# Patient Record
Sex: FEMALE | Race: WHITE | NOT HISPANIC OR LATINO
[De-identification: names, ages, dates, MRNs, and addresses within clinical notes are randomized per-mention and may not be internally consistent; named-entity substitution may affect disease eponyms.]

---

## 2020-10-31 ENCOUNTER — TRANSCRIPTION ENCOUNTER (OUTPATIENT)
Age: 40
End: 2020-10-31

## 2021-01-19 ENCOUNTER — TRANSCRIPTION ENCOUNTER (OUTPATIENT)
Age: 41
End: 2021-01-19

## 2021-02-09 ENCOUNTER — TRANSCRIPTION ENCOUNTER (OUTPATIENT)
Age: 41
End: 2021-02-09

## 2021-04-01 ENCOUNTER — TRANSCRIPTION ENCOUNTER (OUTPATIENT)
Age: 41
End: 2021-04-01

## 2021-07-01 ENCOUNTER — TRANSCRIPTION ENCOUNTER (OUTPATIENT)
Age: 41
End: 2021-07-01

## 2021-07-12 ENCOUNTER — TRANSCRIPTION ENCOUNTER (OUTPATIENT)
Age: 41
End: 2021-07-12

## 2021-08-08 ENCOUNTER — TRANSCRIPTION ENCOUNTER (OUTPATIENT)
Age: 41
End: 2021-08-08

## 2021-08-20 ENCOUNTER — TRANSCRIPTION ENCOUNTER (OUTPATIENT)
Age: 41
End: 2021-08-20

## 2022-01-21 ENCOUNTER — TRANSCRIPTION ENCOUNTER (OUTPATIENT)
Age: 42
End: 2022-01-21

## 2023-03-05 ENCOUNTER — NON-APPOINTMENT (OUTPATIENT)
Age: 43
End: 2023-03-05

## 2023-04-03 ENCOUNTER — APPOINTMENT (OUTPATIENT)
Dept: GASTROENTEROLOGY | Facility: CLINIC | Age: 43
End: 2023-04-03
Payer: MEDICAID

## 2023-04-03 ENCOUNTER — NON-APPOINTMENT (OUTPATIENT)
Age: 43
End: 2023-04-03

## 2023-04-03 DIAGNOSIS — K76.0 FATTY (CHANGE OF) LIVER, NOT ELSEWHERE CLASSIFIED: ICD-10-CM

## 2023-04-03 PROCEDURE — 91200 LIVER ELASTOGRAPHY: CPT

## 2023-04-03 NOTE — ASSESSMENT
[FreeTextEntry1] : FibroScan/ Vibration Controlled Transient Elastography (VCTE) Report\par \par PROBE SIZE: XL\par \par INDICATION: NAFLD/CONKLIN\par \par REFERRING PHYSICIAN: Dr. Will Lennon\par \par  \par PROCEDURE:\par \par Patient was NPO for 4 hours prior to procedure. After providing oral explanations of the procedure to the patient, patient was placed in supine position with right arm in maximum abduction to allow optimal exposure of right lateral abdomen. Patient abdomen was briefly assessed to identify to the terminus of the xyphoid process. The ideal target testing spot was located mid-line and lateral to this point. To acquire proper signals, the skin to liver capsule distance was accessed with the FibroScan probe. Patient was instructed to breathe normally and to abstain from sudden movements during the procedure. Ten shear waves were produced and measurements of the speed of each wave evaluated. Patient tolerated the procedure well and was discharged without incident.\par \par  \par FINDINGS:\par \par - Median shear wave speed of 1.33 meters/second.\par \par - This corresponds to a median Liver Stiffness Score of 5.3 kPa.\par \par - IQR/med 11%\par \par -  dB/m\par  \par RESULTS:\par \par FIBROSIS: F0/F1 Fibrosis\par \par STEATOSIS: S0 - Stage (0-10% steatosis)\par \par The recommendation regarding fibrosis stage and/ or steatosis grade is based on current published scientific literature and the provided patient’s diagnosis. Any further medical or surgical intervention should be made by considering the overall medical condition of the patient.

## 2023-10-07 ENCOUNTER — OFFICE (OUTPATIENT)
Dept: URBAN - METROPOLITAN AREA CLINIC 100 | Facility: CLINIC | Age: 43
Setting detail: OPHTHALMOLOGY
End: 2023-10-07
Payer: COMMERCIAL

## 2023-10-07 DIAGNOSIS — G43.109: ICD-10-CM

## 2023-10-07 DIAGNOSIS — H01.004: ICD-10-CM

## 2023-10-07 DIAGNOSIS — H01.001: ICD-10-CM

## 2023-10-07 DIAGNOSIS — H11.153: ICD-10-CM

## 2023-10-07 DIAGNOSIS — H16.223: ICD-10-CM

## 2023-10-07 DIAGNOSIS — H52.7: ICD-10-CM

## 2023-10-07 PROCEDURE — 99212 OFFICE O/P EST SF 10 MIN: CPT | Performed by: OPHTHALMOLOGY

## 2023-10-07 ASSESSMENT — KERATOMETRY
OD_K2POWER_DIOPTERS: 46.00
OD_K1POWER_DIOPTERS: 45.50
OD_AXISANGLE_DEGREES: 009
OS_K2POWER_DIOPTERS: 45.75
OS_K1POWER_DIOPTERS: 45.25
OS_AXISANGLE_DEGREES: 110

## 2023-10-07 ASSESSMENT — REFRACTION_CURRENTRX
OS_CYLINDER: -0.50
OD_SPHERE: -2.50
OS_OVR_VA: 20/
OD_VPRISM_DIRECTION: SV
OS_SPHERE: -2.50
OS_AXIS: 178
OD_AXIS: 018
OD_OVR_VA: 20/
OD_CYLINDER: -0.50
OS_VPRISM_DIRECTION: SV

## 2023-10-07 ASSESSMENT — REFRACTION_MANIFEST
OD_VA1: 20/20-
OS_SPHERE: -2.75
OS_AXIS: 005
OS_SPHERE: -2.50
OS_AXIS: 005
OD_VA1: 20/20
OD_SPHERE: -2.50
OD_AXIS: 015
OS_CYLINDER: -0.75
OD_CYLINDER: -0.50
OS_CYLINDER: -0.50
OS_VA1: 20/20-
OD_CYLINDER: -0.50
OD_AXIS: 015
OS_VA1: 20/20
OD_SPHERE: -2.50

## 2023-10-07 ASSESSMENT — REFRACTION_AUTOREFRACTION
OD_CYLINDER: -0.50
OS_SPHERE: -2.50
OS_AXIS: 014
OS_CYLINDER: -0.25
OD_AXIS: 003
OD_SPHERE: -2.25

## 2023-10-07 ASSESSMENT — PACHYMETRY
OS_CT_CORRECTION: 2
OS_CT_UM: 516
OD_CT_CORRECTION: 1
OD_CT_UM: 525

## 2023-10-07 ASSESSMENT — TONOMETRY
OS_IOP_MMHG: 18
OD_IOP_MMHG: 18

## 2023-10-07 ASSESSMENT — LID EXAM ASSESSMENTS
OS_BLEPHARITIS: LUL T
OD_BLEPHARITIS: RUL T

## 2023-10-07 ASSESSMENT — VISUAL ACUITY
OD_BCVA: 20/20
OS_BCVA: 20/20

## 2023-10-07 ASSESSMENT — AXIALLENGTH_DERIVED
OD_AL: 23.8394
OD_AL: 23.7404
OS_AL: 23.8836
OD_AL: 23.8394
OS_AL: 24.0849
OS_AL: 23.9336

## 2023-10-07 ASSESSMENT — SUPERFICIAL PUNCTATE KERATITIS (SPK)
OS_SPK: T
OD_SPK: T

## 2023-10-07 ASSESSMENT — SPHEQUIV_DERIVED
OS_SPHEQUIV: -2.75
OD_SPHEQUIV: -2.75
OS_SPHEQUIV: -2.625
OS_SPHEQUIV: -3.125
OD_SPHEQUIV: -2.5
OD_SPHEQUIV: -2.75

## 2023-10-07 ASSESSMENT — CONFRONTATIONAL VISUAL FIELD TEST (CVF)
OS_FINDINGS: FULL
OD_FINDINGS: FULL

## 2023-11-07 ENCOUNTER — OFFICE (OUTPATIENT)
Facility: LOCATION | Age: 43
Setting detail: OPHTHALMOLOGY
End: 2023-11-07
Payer: COMMERCIAL

## 2023-11-07 DIAGNOSIS — H31.29: ICD-10-CM

## 2023-11-07 DIAGNOSIS — H43.393: ICD-10-CM

## 2023-11-07 DIAGNOSIS — H16.223: ICD-10-CM

## 2023-11-07 DIAGNOSIS — H43.811: ICD-10-CM

## 2023-11-07 DIAGNOSIS — H35.443: ICD-10-CM

## 2023-11-07 PROCEDURE — 99213 OFFICE O/P EST LOW 20 MIN: CPT | Performed by: OPHTHALMOLOGY

## 2023-11-07 PROCEDURE — 92250 FUNDUS PHOTOGRAPHY W/I&R: CPT | Performed by: OPHTHALMOLOGY

## 2023-11-07 ASSESSMENT — CONFRONTATIONAL VISUAL FIELD TEST (CVF)
OD_FINDINGS: FULL
OS_FINDINGS: FULL

## 2023-11-08 PROBLEM — H16.223 DRY EYE SYNDROME K SICCA; BOTH EYES: Status: ACTIVE | Noted: 2023-11-07

## 2023-11-08 PROBLEM — H31.29 PERIPAPILLARY ATROPHY ; BOTH EYES: Status: ACTIVE | Noted: 2023-11-07

## 2023-11-08 ASSESSMENT — REFRACTION_AUTOREFRACTION
OD_AXIS: 113
OD_SPHERE: -2.75
OS_AXIS: 088
OS_CYLINDER: +0.50
OD_CYLINDER: +0.75
OS_SPHERE: -3.00

## 2023-11-08 ASSESSMENT — REFRACTION_MANIFEST
OS_CYLINDER: -0.75
OD_AXIS: 015
OD_VA1: 20/20
OS_SPHERE: -2.50
OS_AXIS: 005
OD_SPHERE: -2.50
OD_CYLINDER: -0.50
OS_VA1: 20/20
OD_SPHERE: -2.50
OD_AXIS: 015
OD_VA1: 20/20-
OS_AXIS: 005
OD_CYLINDER: -0.50
OS_CYLINDER: -0.50
OS_SPHERE: -2.75
OS_VA1: 20/20-

## 2023-11-08 ASSESSMENT — REFRACTION_CURRENTRX
OD_OVR_VA: 20/
OS_OVR_VA: 20/
OS_VPRISM_DIRECTION: SV
OS_CYLINDER: +0.50
OS_SPHERE: -3.00
OD_CYLINDER: +0.75
OD_VPRISM_DIRECTION: SV
OD_SPHERE: -3.00
OD_AXIS: 110
OS_AXIS: 093

## 2023-11-08 ASSESSMENT — SUPERFICIAL PUNCTATE KERATITIS (SPK)
OD_SPK: 1+
OS_SPK: 1+

## 2023-11-08 ASSESSMENT — SPHEQUIV_DERIVED
OS_SPHEQUIV: -2.75
OS_SPHEQUIV: -2.75
OD_SPHEQUIV: -2.75
OD_SPHEQUIV: -2.375
OD_SPHEQUIV: -2.75
OS_SPHEQUIV: -3.125

## 2024-03-22 ENCOUNTER — OFFICE (OUTPATIENT)
Facility: LOCATION | Age: 44
Setting detail: OPHTHALMOLOGY
End: 2024-03-22
Payer: COMMERCIAL

## 2024-03-22 ENCOUNTER — RX ONLY (RX ONLY)
Age: 44
End: 2024-03-22

## 2024-03-22 DIAGNOSIS — H35.443: ICD-10-CM

## 2024-03-22 DIAGNOSIS — H31.29: ICD-10-CM

## 2024-03-22 DIAGNOSIS — Z13.5: ICD-10-CM

## 2024-03-22 DIAGNOSIS — H43.393: ICD-10-CM

## 2024-03-22 DIAGNOSIS — H52.13: ICD-10-CM

## 2024-03-22 DIAGNOSIS — H43.811: ICD-10-CM

## 2024-03-22 DIAGNOSIS — H16.223: ICD-10-CM

## 2024-03-22 PROCEDURE — 92015 DETERMINE REFRACTIVE STATE: CPT | Performed by: OPHTHALMOLOGY

## 2024-03-22 PROCEDURE — 92014 COMPRE OPH EXAM EST PT 1/>: CPT | Performed by: OPHTHALMOLOGY

## 2024-03-22 PROCEDURE — OPTOS FUNDUS PHOTOGRAPHY - NO FINDINGS: Performed by: OPHTHALMOLOGY

## 2024-03-22 PROCEDURE — 10004 FNA BX W/O IMG GDN EA ADDL: CPT | Performed by: OPHTHALMOLOGY

## 2024-03-22 PROCEDURE — 92250 FUNDUS PHOTOGRAPHY W/I&R: CPT | Mod: GY | Performed by: OPHTHALMOLOGY

## 2024-04-09 ASSESSMENT — KERATOMETRY
OS_AXISANGLE_DEGREES: 110
OD_K2POWER_DIOPTERS: 46.00
OS_K2POWER_DIOPTERS: 45.75
OD_AXISANGLE_DEGREES: 009
OS_K1POWER_DIOPTERS: 45.25
OD_K1POWER_DIOPTERS: 45.50

## 2024-04-09 ASSESSMENT — REFRACTION_CURRENTRX
OS_VPRISM_DIRECTION: SV
OS_AXIS: 093
OD_SPHERE: -3.00
OD_CYLINDER: +0.75
OD_OVR_VA: 20/
OS_OVR_VA: 20/
OS_CYLINDER: +0.50
OD_VPRISM_DIRECTION: SV
OD_AXIS: 110
OS_SPHERE: -3.00

## 2024-04-09 ASSESSMENT — REFRACTION_MANIFEST
OD_SPHERE: -2.50
OS_VA1: 20/20-
OD_VA1: 20/20-
OS_SPHERE: -3.00
OD_VA1: 20/20
OS_CYLINDER: SPHERE
OD_CYLINDER: SPHERE
OS_AXIS: 005
OD_CYLINDER: -0.50
OD_SPHERE: -2.75
OS_VA1: 20/20
OS_SPHERE: -2.75
OS_CYLINDER: -0.75
OD_AXIS: 015

## 2024-04-09 ASSESSMENT — AXIALLENGTH_DERIVED
OD_AL: 23.8394
OS_AL: 24.0849

## 2024-04-09 ASSESSMENT — SPHEQUIV_DERIVED
OD_SPHEQUIV: -2.75
OS_SPHEQUIV: -3.125

## 2024-05-10 ASSESSMENT — REFRACTION_CURRENTRX
OD_VPRISM_DIRECTION: SV
OD_CYLINDER: +0.75
OS_CYLINDER: +0.50
OD_AXIS: 110
OS_AXIS: 093
OS_SPHERE: -3.00
OD_SPHERE: -3.00
OS_OVR_VA: 20/
OD_OVR_VA: 20/
OS_VPRISM_DIRECTION: SV

## 2024-05-10 ASSESSMENT — REFRACTION_MANIFEST
OS_SPHERE: -2.75
OS_CYLINDER: -0.75
OS_SPHERE: -3.00
OD_AXIS: 015
OD_SPHERE: -2.75
OD_SPHERE: -2.50
OS_CYLINDER: SPHERE
OD_CYLINDER: -0.50
OS_AXIS: 005
OS_VA1: 20/20-
OD_CYLINDER: SPHERE
OD_VA1: 20/20
OS_VA1: 20/20
OD_VA1: 20/20-

## 2024-05-10 ASSESSMENT — KERATOMETRY
OS_K1POWER_DIOPTERS: 45.25
OD_K2POWER_DIOPTERS: 46.00
OS_K2POWER_DIOPTERS: 45.75
OS_AXISANGLE_DEGREES: 110
OD_K1POWER_DIOPTERS: 45.50
OD_AXISANGLE_DEGREES: 009